# Patient Record
Sex: MALE | Race: WHITE | NOT HISPANIC OR LATINO
[De-identification: names, ages, dates, MRNs, and addresses within clinical notes are randomized per-mention and may not be internally consistent; named-entity substitution may affect disease eponyms.]

---

## 2022-10-14 PROBLEM — Z00.00 ENCOUNTER FOR PREVENTIVE HEALTH EXAMINATION: Status: ACTIVE | Noted: 2022-10-14

## 2022-10-18 ENCOUNTER — APPOINTMENT (OUTPATIENT)
Dept: ORTHOPEDIC SURGERY | Facility: CLINIC | Age: 57
End: 2022-10-18

## 2022-10-20 ENCOUNTER — APPOINTMENT (OUTPATIENT)
Dept: ORTHOPEDIC SURGERY | Facility: CLINIC | Age: 57
End: 2022-10-20

## 2022-10-20 VITALS — WEIGHT: 216 LBS | HEIGHT: 68 IN | BODY MASS INDEX: 32.74 KG/M2

## 2022-10-20 DIAGNOSIS — Z78.9 OTHER SPECIFIED HEALTH STATUS: ICD-10-CM

## 2022-10-20 DIAGNOSIS — M17.11 UNILATERAL PRIMARY OSTEOARTHRITIS, RIGHT KNEE: ICD-10-CM

## 2022-10-20 PROCEDURE — 99203 OFFICE O/P NEW LOW 30 MIN: CPT

## 2022-10-20 PROCEDURE — 73560 X-RAY EXAM OF KNEE 1 OR 2: CPT | Mod: RT

## 2022-10-20 RX ORDER — TRIAMCINOLONE ACETONIDE 1 MG/G
0.1 CREAM TOPICAL
Qty: 60 | Refills: 0 | Status: DISCONTINUED | COMMUNITY
Start: 2022-10-15

## 2022-10-20 RX ORDER — CLINDAMYCIN PHOSPHATE 1 G/10ML
1 GEL TOPICAL
Qty: 60 | Refills: 0 | Status: DISCONTINUED | COMMUNITY
Start: 2022-09-27

## 2022-10-20 RX ORDER — HALOBETASOL PROPIONATE 0.5 MG/G
0.05 OINTMENT TOPICAL
Qty: 60 | Refills: 0 | Status: DISCONTINUED | COMMUNITY
Start: 2022-09-18

## 2022-10-20 RX ORDER — BETAMETHASONE DIPROPIONATE 0.5 MG/G
0.05 OINTMENT, AUGMENTED TOPICAL
Qty: 50 | Refills: 0 | Status: DISCONTINUED | COMMUNITY
Start: 2022-06-25

## 2022-10-20 RX ORDER — PREDNISONE 20 MG/1
20 TABLET ORAL
Qty: 14 | Refills: 0 | Status: DISCONTINUED | COMMUNITY
Start: 2022-08-17

## 2022-10-20 RX ORDER — DIPHENHYDRAMINE HCL 25 MG
25 CAPSULE ORAL
Qty: 72 | Refills: 0 | Status: DISCONTINUED | COMMUNITY
Start: 2022-08-17

## 2022-10-20 RX ORDER — HALOBETASOL PROPIONATE 0.5 MG/G
0.05 CREAM TOPICAL
Qty: 50 | Refills: 0 | Status: DISCONTINUED | COMMUNITY
Start: 2022-10-16

## 2022-10-20 RX ORDER — LORATADINE 10 MG/1
10 TABLET ORAL
Qty: 30 | Refills: 0 | Status: DISCONTINUED | COMMUNITY
Start: 2022-09-08

## 2022-10-20 RX ORDER — BETAMETHASONE VALERATE 1 MG/G
0.1 CREAM TOPICAL
Qty: 60 | Refills: 0 | Status: DISCONTINUED | COMMUNITY
Start: 2022-09-29

## 2022-10-20 RX ORDER — MELOXICAM 15 MG/1
15 TABLET ORAL
Qty: 30 | Refills: 2 | Status: ACTIVE | COMMUNITY
Start: 2022-10-20 | End: 1900-01-01

## 2022-10-20 RX ORDER — MELOXICAM 15 MG/1
15 TABLET ORAL
Qty: 30 | Refills: 0 | Status: DISCONTINUED | COMMUNITY
Start: 2022-03-29

## 2022-10-20 RX ORDER — HYDROXYZINE HYDROCHLORIDE 25 MG/1
25 TABLET ORAL
Qty: 60 | Refills: 0 | Status: DISCONTINUED | COMMUNITY
Start: 2022-09-19

## 2022-10-20 NOTE — DISCUSSION/SUMMARY
[de-identified] : Surgery is not recommended at this time. Discussed options such as physical therapy, CSI, gel injections, NSAIDS. The patient chose to do nothing at this time and will follow up if he wants to go to PT or get a CSI. \par Discussed importance of proper exercise and stretching to maintain full extension and increase strength.\par Patient will follow up PRN \par

## 2022-10-20 NOTE — PHYSICAL EXAM
[NL (140)] : flexion 140 degrees [NL (0)] : extension 0 degrees [5___] : hamstring 5[unfilled]/5 [Right] : right knee [AP] : anteroposterior [Lateral] : lateral [There are no fractures, subluxations or dislocations. No significant abnormalities are seen] : There are no fractures, subluxations or dislocations. No significant abnormalities are seen [] : no extensor lag [FreeTextEntry9] : patellar bone spurring [de-identified] : False

## 2022-10-20 NOTE — HISTORY OF PRESENT ILLNESS
[de-identified] : 57M presents with right knee pain, lateral sided since March 2022. He reports the pain came on suddenly, denies injury/trauma. He admits to going to an UC, was given a CSI which helped and also an rx for Meloxicam (3 month supply) recently ran out. Meloxicam did help. Reports having tightness behind the knee and the pain is starting to radiate to the back of the knee and into the medial side. Denies x-rays/testing/imaging. Reports progressively getting worse. Pain worse with climbing stairs/hills.\par \par Patient owns eReceipts

## 2023-01-11 ENCOUNTER — APPOINTMENT (OUTPATIENT)
Dept: ORTHOPEDIC SURGERY | Facility: CLINIC | Age: 58
End: 2023-01-11
Payer: COMMERCIAL

## 2023-01-11 DIAGNOSIS — M20.22 HALLUX RIGIDUS, LEFT FOOT: ICD-10-CM

## 2023-01-11 PROCEDURE — 99213 OFFICE O/P EST LOW 20 MIN: CPT

## 2023-01-11 PROCEDURE — 73620 X-RAY EXAM OF FOOT: CPT | Mod: LT

## 2023-01-11 NOTE — DISCUSSION/SUMMARY
[de-identified] : Options were discussed in length including NSAIDS, anti-inflammatories, oral steroids, physical therapy, CSI, or surgery \par Discussed CSI, but it may not provide relief due to the lack of joint space\par Recommended moving foot manually before putting shoe on\par Recommended Voltaren anti inflammatory cream. \par Discussed fusing joints surgically - discussed procedure and recovery with the pt. \par Patient was referred to Dr. Wallace to further discuss options including surgery\par \par \par Entered by Divya Irizarry acting as Scribe. \par Dr. Deng Attestation\par The documentation recorded by the scribe, in my presence, accurately reflects the service I, Dr. Deng, personally performed, and the decisions made by me with my edits as appropriate.\par

## 2023-01-11 NOTE — PHYSICAL EXAM
[Left] : left foot [] : no full range of motion of foot [There are no fractures, subluxations or dislocations. No significant abnormalities are seen] : There are no fractures, subluxations or dislocations. No significant abnormalities are seen [FreeTextEntry3] : \par  [FreeTextEntry8] : tenderness at MTP joint  [FreeTextEntry9] : D [de-identified] : DJD MTP Joint [de-identified] : MTP joint DF 0 degrees [TWNoteComboBox6] : MTP joint PF 0 degrees

## 2023-01-11 NOTE — HISTORY OF PRESENT ILLNESS
[7] : 7 [0] : 0 [Shooting] : shooting [Rest] : rest [Walking] : walking [Exercising] : exercising [Stairs] : stairs [] : yes [de-identified] : 01/11/23: Pt is here for his Lt foot. States his toe has been hurting for the past 20 years. Pt states he takes Tylenol rarely for pain relief. States hes had imaging done 20 years ago. [FreeTextEntry1] : Lt foot [FreeTextEntry8] : N/A

## 2023-02-14 ENCOUNTER — RX RENEWAL (OUTPATIENT)
Age: 58
End: 2023-02-14

## 2025-07-16 ENCOUNTER — APPOINTMENT (OUTPATIENT)
Dept: ORTHOPEDIC SURGERY | Facility: CLINIC | Age: 60
End: 2025-07-16
Payer: COMMERCIAL

## 2025-07-16 VITALS — BODY MASS INDEX: 35.92 KG/M2 | HEIGHT: 68 IN | WEIGHT: 237 LBS

## 2025-07-16 PROCEDURE — 73564 X-RAY EXAM KNEE 4 OR MORE: CPT | Mod: 50

## 2025-07-16 PROCEDURE — 99204 OFFICE O/P NEW MOD 45 MIN: CPT

## 2025-07-16 RX ORDER — MELOXICAM 15 MG/1
15 TABLET ORAL
Qty: 30 | Refills: 1 | Status: ACTIVE | COMMUNITY
Start: 2025-07-16 | End: 1900-01-01